# Patient Record
Sex: FEMALE | Race: WHITE | NOT HISPANIC OR LATINO | Employment: FULL TIME | URBAN - METROPOLITAN AREA
[De-identification: names, ages, dates, MRNs, and addresses within clinical notes are randomized per-mention and may not be internally consistent; named-entity substitution may affect disease eponyms.]

---

## 2021-03-09 ENCOUNTER — OFFICE VISIT (OUTPATIENT)
Dept: OBGYN CLINIC | Facility: CLINIC | Age: 36
End: 2021-03-09
Payer: COMMERCIAL

## 2021-03-09 VITALS
BODY MASS INDEX: 22.76 KG/M2 | DIASTOLIC BLOOD PRESSURE: 66 MMHG | HEART RATE: 70 BPM | HEIGHT: 67 IN | SYSTOLIC BLOOD PRESSURE: 100 MMHG | WEIGHT: 145 LBS

## 2021-03-09 DIAGNOSIS — S62.232B: Primary | ICD-10-CM

## 2021-03-09 PROCEDURE — 99203 OFFICE O/P NEW LOW 30 MIN: CPT | Performed by: ORTHOPAEDIC SURGERY

## 2021-03-09 NOTE — PROGRESS NOTES
Assessment/Plan:  1  Open displaced fracture of base of first metacarpal bone of left hand, unspecified fracture morphology, initial encounter  Ambulatory referral to [facility] Trever Rider has a displaced fracture at the base of her first metacarpal   This type of fracture will not heal with conservative measures and will likely require surgery  I would like for her to follow-up with my partner Dr Susan Galvez, who is our orthopedic hand specialist, to discuss surgical intervention for her fracture  She 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 plan  We will place her back in a thumb spica splint and have her follow-up with Dr Susan Galvez later this week  Subjective:   Timi Gonzalez is a 30 y o  female who presents to the office for evaluation for left hand injury  She states she was kicked in the hand by her horse on 3/6/2021  She went to [facility] and had an x-ray which showed a displaced first metacarpal fracture  She was advised to follow-up with orthopedics  The other provider she was sent to could not take her insurance and she was redirected to our office today  Today she has pain that is sharp and stabbing in nature in the base of her left thumb  It worsens with any motion of her thumb  She feels swelling and numbness down into her left thumb  She has been immobilized in a thumb spica splint  Review of Systems   Constitutional: Negative for chills, fever and unexpected weight change  HENT: Negative for hearing loss, nosebleeds and sore throat  Eyes: Negative for pain, redness and visual disturbance  Respiratory: Negative for cough, shortness of breath and wheezing  Cardiovascular: Negative for chest pain, palpitations and leg swelling  Gastrointestinal: Negative for abdominal pain, nausea and vomiting  Endocrine: Negative for polydipsia and polyuria  Genitourinary: Negative for dysuria and hematuria     Musculoskeletal:        See HPI   Skin: Negative for rash and wound    Neurological: Negative for dizziness, numbness and headaches  Psychiatric/Behavioral: Negative for decreased concentration and suicidal ideas  The patient is not nervous/anxious  History reviewed  No pertinent past medical history  Past Surgical History:   Procedure Laterality Date    WISDOM TOOTH EXTRACTION         Family History   Problem Relation Age of Onset    Leukemia Maternal Grandmother     Diabetes Maternal Grandfather        Social History     Occupational History    Not on file   Tobacco Use    Smoking status: Never Smoker    Smokeless tobacco: Never Used   Substance and Sexual Activity    Alcohol use: Never     Frequency: Never    Drug use: Not on file    Sexual activity: Not on file       No current outpatient medications on file  Allergies   Allergen Reactions    Amoxicillin Hives and Rash       Objective:  Vitals:    03/09/21 1419   BP: 100/66   Pulse: 70       Left Hand Exam     Tenderness   Left hand tenderness location: Tenderness to the base of the first metacarpal      Range of Motion   Hand   MP Thumb: abnormal   DIP Thumb: abnormal     Other   Erythema: absent  Sensation: decreased  Pulse: present            Physical Exam  Vitals signs reviewed  Constitutional:       Appearance: She is well-developed  HENT:      Head: Normocephalic and atraumatic  Eyes:      Conjunctiva/sclera: Conjunctivae normal       Pupils: Pupils are equal, round, and reactive to light  Neck:      Musculoskeletal: Normal range of motion and neck supple  Cardiovascular:      Rate and Rhythm: Normal rate  Pulmonary:      Effort: Pulmonary effort is normal  No respiratory distress  Skin:     General: Skin is warm and dry  Neurological:      Mental Status: She is alert and oriented to person, place, and time  Psychiatric:         Behavior: Behavior normal          I have personally reviewed pertinent films in PACS and my interpretation is as follows:   Three-view x-ray of the left hand dated 3/6/2021 demonstrates a fracture of the base of the first metacarpal with dislocation of the fracture fragment

## 2021-03-11 ENCOUNTER — CONSULT (OUTPATIENT)
Dept: OBGYN CLINIC | Facility: CLINIC | Age: 36
End: 2021-03-11
Payer: COMMERCIAL

## 2021-03-11 VITALS
SYSTOLIC BLOOD PRESSURE: 95 MMHG | DIASTOLIC BLOOD PRESSURE: 60 MMHG | HEIGHT: 67 IN | BODY MASS INDEX: 22.76 KG/M2 | HEART RATE: 76 BPM | WEIGHT: 145 LBS

## 2021-03-11 DIAGNOSIS — S62.232B: Primary | ICD-10-CM

## 2021-03-11 DIAGNOSIS — Z20.822 ENCOUNTER FOR PREOPERATIVE SCREENING LABORATORY TESTING FOR COVID-19 VIRUS: ICD-10-CM

## 2021-03-11 DIAGNOSIS — Z01.812 ENCOUNTER FOR PREOPERATIVE SCREENING LABORATORY TESTING FOR COVID-19 VIRUS: ICD-10-CM

## 2021-03-11 PROCEDURE — U0005 INFEC AGEN DETEC AMPLI PROBE: HCPCS | Performed by: ORTHOPAEDIC SURGERY

## 2021-03-11 PROCEDURE — 99213 OFFICE O/P EST LOW 20 MIN: CPT | Performed by: ORTHOPAEDIC SURGERY

## 2021-03-11 PROCEDURE — U0003 INFECTIOUS AGENT DETECTION BY NUCLEIC ACID (DNA OR RNA); SEVERE ACUTE RESPIRATORY SYNDROME CORONAVIRUS 2 (SARS-COV-2) (CORONAVIRUS DISEASE [COVID-19]), AMPLIFIED PROBE TECHNIQUE, MAKING USE OF HIGH THROUGHPUT TECHNOLOGIES AS DESCRIBED BY CMS-2020-01-R: HCPCS | Performed by: ORTHOPAEDIC SURGERY

## 2021-03-11 RX ORDER — CLINDAMYCIN PHOSPHATE 600 MG/50ML
600 INJECTION INTRAVENOUS ONCE
Status: CANCELLED | OUTPATIENT
Start: 2021-03-12 | End: 2021-03-11

## 2021-03-11 RX ORDER — IBUPROFEN 200 MG
TABLET ORAL EVERY 6 HOURS PRN
COMMUNITY

## 2021-03-11 NOTE — H&P (VIEW-ONLY)
Assessment/Plan:  1  Open displaced fracture of base of first metacarpal bone of left hand, unspecified fracture morphology, initial encounter     2  Encounter for preoperative screening laboratory testing for COVID-19 virus  PAT Covid Screening       Scribe Attestation    I,:  Ana Carrillo MA am acting as a scribe while in the presence of the attending physician :       I,:  Saint Buff,  personally performed the services described in this documentation    as scribed in my presence :             I discussed with David that x-rays demonstrates a displaced base of the first metacarpal fracture with an associated dislocation of the [facility] joint  I explained to that this will not heal with conservative treatment options and she may have issues with stiffness and pain  Surgical intervention was discussed in the form of closed reduction percutaneous pinning  She was agreeable to this  I expressed to her the importance of keeping this clean  She does work with horses  She 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  Risks of the surgery are inclusive of but not limited to bleeding, infection, nerve injury, blood clot, worsening of symptoms, not achieving the anticipated results, persistent stiffness, weakness and the need for additional surgery  The patient verbally stated they understood those risks and would like to proceed with the surgery  Surgical consent was signed in the office today  I will see her the day of surgery  Patient will require a COVID screening prior to surgery  This will be performed today  Surgery will be scheduled for tomorrow  Subjective:   Mary Lou Wilkinson is a 30 y o  female who presents to the office today as a referral from my partner Dr Johnny Johnson for evaluation of left thumb pain  Patient states on 3/6/21 she was kicked in the hand by her horse  She states she noted immediate pain and swelling  She presented to [facility] after the injury where x-rays were taken   She was diagnosed with a first metacarpal fracture  Patient was placed in a splint and told to follow up with orthopedics  Patient was been tolerating the splint well  She was evaluated by my partner Dr Kenya Turk on 3/9/21 and was referred for surgical intervention  Patient states initially she was able to self reduce the thumb however, if continued to pop out of place  Patient states her swelling has improved  She notes pain to the base of her thumb  She also notes numbness to the tip of the thumb and slightness to her index finger  Review of Systems   Constitutional: Negative for chills and fever  HENT: Negative for drooling and sneezing  Eyes: Negative for redness  Respiratory: Negative for cough and wheezing  Gastrointestinal: Negative for nausea and vomiting  Musculoskeletal: Negative for arthralgias, joint swelling and myalgias  Neurological: Negative for weakness and numbness  Psychiatric/Behavioral: Negative for behavioral problems  The patient is not nervous/anxious  History reviewed  No pertinent past medical history  Past Surgical History:   Procedure Laterality Date    WISDOM TOOTH EXTRACTION         Family History   Problem Relation Age of Onset    Leukemia Maternal Grandmother     Diabetes Maternal Grandfather        Social History     Occupational History    Not on file   Tobacco Use    Smoking status: Never Smoker    Smokeless tobacco: Never Used   Substance and Sexual Activity    Alcohol use: Never     Frequency: Never    Drug use: Not on file    Sexual activity: Not on file       No current outpatient medications on file      Allergies   Allergen Reactions    Amoxicillin Hives and Rash       Objective:  Vitals:    03/11/21 0900   BP: 95/60   Pulse: 76       Ortho Exam     Left thumb    Swelling improved  Bruising about the thumb   Compartments soft  Brisk capillary refill  S/m intact median, radial, and ulnar nerve     Physical Exam  Constitutional:       Appearance: She is well-developed  HENT:      Head: Normocephalic and atraumatic  Eyes:      General:         Right eye: No discharge  Left eye: No discharge  Conjunctiva/sclera: Conjunctivae normal    Neck:      Musculoskeletal: Normal range of motion and neck supple  Cardiovascular:      Rate and Rhythm: Normal rate  Pulmonary:      Effort: Pulmonary effort is normal  No respiratory distress  Musculoskeletal:      Comments: As noted in HPI   Skin:     General: Skin is warm and dry  Neurological:      Mental Status: She is alert and oriented to person, place, and time  Psychiatric:         Behavior: Behavior normal          Thought Content: Thought content normal          Judgment: Judgment normal          I have personally reviewed pertinent films in PACS and my interpretation is as follows:X-ray left thumb performed at an outside facility demonstrates displaced base of the first metacarpal fracture

## 2021-03-11 NOTE — PRE-PROCEDURE INSTRUCTIONS
My Surgical Experience    The following information was developed to assist you to prepare for your operation  What do I need to do before coming to the hospital?   Arrange for a responsible person to drive you to and from the hospital    Arrange care for your children at home  Children are not allowed in the recovery areas of the hospital   Plan to wear clothing that is easy to put on and take off  If you are having shoulder surgery, wear a shirt that buttons or zippers in the front  Bathing  o Shower the evening before and the morning of your surgery with an antibacterial soap  Please refer to the Pre Op Showering Instructions for Surgery Patients Sheet   o Remove nail polish and all body piercing jewelry  o Do not shave any body part for at least 24 hours before surgery-this includes face, arms, legs and upper body  Food  o Nothing to eat or drink after midnight the night before your surgery  This includes candy and chewing gum  o Exception: If your surgery is after 12:00pm (noon), you may have clear liquids such as 7-Up®, ginger ale, apple or cranberry juice, Jell-O®, water, or clear broth until 8:00 am  o Do not drink milk or juice with pulp on the morning before surgery  o Do not drink alcohol 24 hours before surgery  Medicine  o Follow instructions you received from your surgeon about which medicines you may take on the day of surgery  o If instructed to take medicine on the morning of surgery, take pills with just a small sip of water  Call your prescribing doctor for specific infroamtion on what to do if you take insulin    What should I bring to the hospital?    Bring:  Leann Jones or a walker, if you have them, for foot or knee surgery   A list of the daily medicines, vitamins, minerals, herbals and nutritional supplements you take   Include the dosages of medicines and the time you take them each day   Glasses, dentures or hearing aids   Minimal clothing; you will be wearing hospital sleepwear   Photo ID; required to verify your identity   If you have a Living Will or Power of , bring a copy of the documents   If you have an ostomy, bring an extra pouch and any supplies you use    Do not bring   Medicines or inhalers   Money, valuables or jewelry    What other information should I know about the day of surgery?  Notify your surgeons if you develop a cold, sore throat, cough, fever, rash or any other illness   Report to the Ambulatory Surgical/Same Day Surgery Unit   You will be instructed to stop at Registration only if you have not been pre-registered   Inform your  fi they do not stay that they will be asked by the staff to leave a phone number where they can be reached   Be available to be reached before surgery  In the event the operating room schedule changes, you may be asked to come in earlier or later than expected    *It is important to tell your doctor and others involved in your health care if you are taking or have been taking any non-prescription drugs, vitamins, minerals, herbals or other nutritional supplements  Any of these may interact with some food or medicines and cause a reaction      Pre-Surgery Instructions:   Medication Instructions    ibuprofen (MOTRIN) 200 mg tablet Instructed patient per Anesthesia Guidelines

## 2021-03-11 NOTE — PROGRESS NOTES
Assessment/Plan:  1  Open displaced fracture of base of first metacarpal bone of left hand, unspecified fracture morphology, initial encounter     2  Encounter for preoperative screening laboratory testing for COVID-19 virus  PAT Covid Screening       Scribe Attestation    I,:  Ana Carrillo MA am acting as a scribe while in the presence of the attending physician :       I,:  Natalie Calles DO personally performed the services described in this documentation    as scribed in my presence :             I discussed with Amnasolo that x-rays demonstrates a displaced base of the first metacarpal fracture with an associated dislocation of the [address] joint  I explained to that this will not heal with conservative treatment options and she may have issues with stiffness and pain  Surgical intervention was discussed in the form of closed reduction percutaneous pinning  She was agreeable to this  I expressed to her the importance of keeping this clean  She does work with horses  She 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  Risks of the surgery are inclusive of but not limited to bleeding, infection, nerve injury, blood clot, worsening of symptoms, not achieving the anticipated results, persistent stiffness, weakness and the need for additional surgery  The patient verbally stated they understood those risks and would like to proceed with the surgery  Surgical consent was signed in the office today  I will see her the day of surgery  Patient will require a COVID screening prior to surgery  This will be performed today  Surgery will be scheduled for tomorrow  Subjective:   Yessica Hawk is a 30 y o  female who presents to the office today as a referral from my partner Dr Kenneth Soriano for evaluation of left thumb pain  Patient states on 3/6/21 she was kicked in the hand by her horse  She states she noted immediate pain and swelling  She presented to [facility] after the injury where x-rays were taken   She was diagnosed with a first metacarpal fracture  Patient was placed in a splint and told to follow up with orthopedics  Patient was been tolerating the splint well  She was evaluated by my partner Dr Nirmal Linder on 3/9/21 and was referred for surgical intervention  Patient states initially she was able to self reduce the thumb however, if continued to pop out of place  Patient states her swelling has improved  She notes pain to the base of her thumb  She also notes numbness to the tip of the thumb and slightness to her index finger  Review of Systems   Constitutional: Negative for chills and fever  HENT: Negative for drooling and sneezing  Eyes: Negative for redness  Respiratory: Negative for cough and wheezing  Gastrointestinal: Negative for nausea and vomiting  Musculoskeletal: Negative for arthralgias, joint swelling and myalgias  Neurological: Negative for weakness and numbness  Psychiatric/Behavioral: Negative for behavioral problems  The patient is not nervous/anxious  History reviewed  No pertinent past medical history  Past Surgical History:   Procedure Laterality Date    WISDOM TOOTH EXTRACTION         Family History   Problem Relation Age of Onset    Leukemia Maternal Grandmother     Diabetes Maternal Grandfather        Social History     Occupational History    Not on file   Tobacco Use    Smoking status: Never Smoker    Smokeless tobacco: Never Used   Substance and Sexual Activity    Alcohol use: Never     Frequency: Never    Drug use: Not on file    Sexual activity: Not on file       No current outpatient medications on file      Allergies   Allergen Reactions    Amoxicillin Hives and Rash       Objective:  Vitals:    03/11/21 0900   BP: 95/60   Pulse: 76       Ortho Exam     Left thumb    Swelling improved  Bruising about the thumb   Compartments soft  Brisk capillary refill  S/m intact median, radial, and ulnar nerve     Physical Exam  Constitutional:       Appearance: She is well-developed  HENT:      Head: Normocephalic and atraumatic  Eyes:      General:         Right eye: No discharge  Left eye: No discharge  Conjunctiva/sclera: Conjunctivae normal    Neck:      Musculoskeletal: Normal range of motion and neck supple  Cardiovascular:      Rate and Rhythm: Normal rate  Pulmonary:      Effort: Pulmonary effort is normal  No respiratory distress  Musculoskeletal:      Comments: As noted in HPI   Skin:     General: Skin is warm and dry  Neurological:      Mental Status: She is alert and oriented to person, place, and time  Psychiatric:         Behavior: Behavior normal          Thought Content: Thought content normal          Judgment: Judgment normal          I have personally reviewed pertinent films in PACS and my interpretation is as follows:X-ray left thumb performed at an outside facility demonstrates displaced base of the first metacarpal fracture

## 2021-03-12 ENCOUNTER — ANESTHESIA (OUTPATIENT)
Dept: PERIOP | Facility: HOSPITAL | Age: 36
End: 2021-03-12
Payer: COMMERCIAL

## 2021-03-12 ENCOUNTER — HOSPITAL ENCOUNTER (OUTPATIENT)
Facility: HOSPITAL | Age: 36
Setting detail: OUTPATIENT SURGERY
Discharge: HOME | End: 2021-03-12
Attending: ORTHOPAEDIC SURGERY | Admitting: ORTHOPAEDIC SURGERY
Payer: COMMERCIAL

## 2021-03-12 ENCOUNTER — ANESTHESIA EVENT (OUTPATIENT)
Dept: PERIOP | Facility: HOSPITAL | Age: 36
End: 2021-03-12
Payer: COMMERCIAL

## 2021-03-12 VITALS
TEMPERATURE: 97.5 F | DIASTOLIC BLOOD PRESSURE: 63 MMHG | SYSTOLIC BLOOD PRESSURE: 149 MMHG | RESPIRATION RATE: 16 BRPM | HEART RATE: 79 BPM | OXYGEN SATURATION: 100 %

## 2021-03-12 LAB
EXT PREGNANCY TEST URINE: NEGATIVE
EXT. CONTROL: NORMAL
FLUAV RNA RESP QL NAA+PROBE: NEGATIVE
FLUBV RNA RESP QL NAA+PROBE: NEGATIVE
RSV RNA RESP QL NAA+PROBE: NEGATIVE
SARS-COV-2 RNA RESP QL NAA+PROBE: NEGATIVE

## 2021-03-12 PROCEDURE — C1713 ANCHOR/SCREW BN/BN,TIS/BN: HCPCS | Performed by: ORTHOPAEDIC SURGERY

## 2021-03-12 PROCEDURE — 81025 URINE PREGNANCY TEST: CPT | Performed by: ORTHOPAEDIC SURGERY

## 2021-03-12 PROCEDURE — 0241U HB NFCT DS VIR RESP RNA 4 TRGT: CPT | Performed by: ORTHOPAEDIC SURGERY

## 2021-03-12 PROCEDURE — 26650 TREAT THUMB FRACTURE: CPT | Performed by: ORTHOPAEDIC SURGERY

## 2021-03-12 DEVICE — K- WIRE, SINGLE TROCAR POINT
Type: IMPLANTABLE DEVICE | Site: HAND | Status: FUNCTIONAL
Brand: LOCON-T

## 2021-03-12 RX ORDER — SODIUM CHLORIDE, SODIUM LACTATE, POTASSIUM CHLORIDE, CALCIUM CHLORIDE 600; 310; 30; 20 MG/100ML; MG/100ML; MG/100ML; MG/100ML
50 INJECTION, SOLUTION INTRAVENOUS CONTINUOUS
Status: DISCONTINUED | OUTPATIENT
Start: 2021-03-12 | End: 2021-03-12 | Stop reason: HOSPADM

## 2021-03-12 RX ORDER — SODIUM CHLORIDE, SODIUM LACTATE, POTASSIUM CHLORIDE, CALCIUM CHLORIDE 600; 310; 30; 20 MG/100ML; MG/100ML; MG/100ML; MG/100ML
INJECTION, SOLUTION INTRAVENOUS CONTINUOUS PRN
Status: DISCONTINUED | OUTPATIENT
Start: 2021-03-12 | End: 2021-03-12

## 2021-03-12 RX ORDER — PROPOFOL 10 MG/ML
INJECTION, EMULSION INTRAVENOUS AS NEEDED
Status: DISCONTINUED | OUTPATIENT
Start: 2021-03-12 | End: 2021-03-12

## 2021-03-12 RX ORDER — MIDAZOLAM HYDROCHLORIDE 2 MG/2ML
INJECTION, SOLUTION INTRAMUSCULAR; INTRAVENOUS AS NEEDED
Status: DISCONTINUED | OUTPATIENT
Start: 2021-03-12 | End: 2021-03-12

## 2021-03-12 RX ORDER — GLYCOPYRROLATE 0.2 MG/ML
INJECTION INTRAMUSCULAR; INTRAVENOUS AS NEEDED
Status: DISCONTINUED | OUTPATIENT
Start: 2021-03-12 | End: 2021-03-12

## 2021-03-12 RX ORDER — ONDANSETRON 2 MG/ML
4 INJECTION INTRAMUSCULAR; INTRAVENOUS ONCE AS NEEDED
Status: DISCONTINUED | OUTPATIENT
Start: 2021-03-12 | End: 2021-03-12 | Stop reason: HOSPADM

## 2021-03-12 RX ORDER — FENTANYL CITRATE/PF 50 MCG/ML
25 SYRINGE (ML) INJECTION
Status: DISCONTINUED | OUTPATIENT
Start: 2021-03-12 | End: 2021-03-12 | Stop reason: HOSPADM

## 2021-03-12 RX ORDER — EPHEDRINE SULFATE 50 MG/ML
INJECTION INTRAVENOUS AS NEEDED
Status: DISCONTINUED | OUTPATIENT
Start: 2021-03-12 | End: 2021-03-12

## 2021-03-12 RX ORDER — MAGNESIUM HYDROXIDE 1200 MG/15ML
LIQUID ORAL AS NEEDED
Status: DISCONTINUED | OUTPATIENT
Start: 2021-03-12 | End: 2021-03-12 | Stop reason: HOSPADM

## 2021-03-12 RX ORDER — DEXAMETHASONE SODIUM PHOSPHATE 4 MG/ML
INJECTION, SOLUTION INTRA-ARTICULAR; INTRALESIONAL; INTRAMUSCULAR; INTRAVENOUS; SOFT TISSUE AS NEEDED
Status: DISCONTINUED | OUTPATIENT
Start: 2021-03-12 | End: 2021-03-12

## 2021-03-12 RX ORDER — PROPOFOL 10 MG/ML
INJECTION, EMULSION INTRAVENOUS CONTINUOUS PRN
Status: DISCONTINUED | OUTPATIENT
Start: 2021-03-12 | End: 2021-03-12

## 2021-03-12 RX ORDER — ONDANSETRON 2 MG/ML
INJECTION INTRAMUSCULAR; INTRAVENOUS AS NEEDED
Status: DISCONTINUED | OUTPATIENT
Start: 2021-03-12 | End: 2021-03-12

## 2021-03-12 RX ORDER — CLINDAMYCIN PHOSPHATE 600 MG/50ML
600 INJECTION INTRAVENOUS ONCE
Status: COMPLETED | OUTPATIENT
Start: 2021-03-12 | End: 2021-03-12

## 2021-03-12 RX ORDER — FENTANYL CITRATE 50 UG/ML
INJECTION, SOLUTION INTRAMUSCULAR; INTRAVENOUS AS NEEDED
Status: DISCONTINUED | OUTPATIENT
Start: 2021-03-12 | End: 2021-03-12

## 2021-03-12 RX ADMIN — PROPOFOL 50 MG: 10 INJECTION, EMULSION INTRAVENOUS at 14:30

## 2021-03-12 RX ADMIN — PROPOFOL 30 MG: 10 INJECTION, EMULSION INTRAVENOUS at 14:36

## 2021-03-12 RX ADMIN — ONDANSETRON 4 MG: 2 INJECTION INTRAMUSCULAR; INTRAVENOUS at 14:42

## 2021-03-12 RX ADMIN — LIDOCAINE HYDROCHLORIDE 40 MG: 20 INJECTION, SOLUTION INTRAVENOUS at 14:30

## 2021-03-12 RX ADMIN — FENTANYL CITRATE 50 MCG: 50 INJECTION, SOLUTION INTRAMUSCULAR; INTRAVENOUS at 14:40

## 2021-03-12 RX ADMIN — CLINDAMYCIN PHOSPHATE 600 MG: 600 INJECTION, SOLUTION INTRAVENOUS at 14:29

## 2021-03-12 RX ADMIN — SODIUM CHLORIDE, SODIUM LACTATE, POTASSIUM CHLORIDE, AND CALCIUM CHLORIDE: .6; .31; .03; .02 INJECTION, SOLUTION INTRAVENOUS at 14:28

## 2021-03-12 RX ADMIN — PROPOFOL 150 MCG/KG/MIN: 10 INJECTION, EMULSION INTRAVENOUS at 14:30

## 2021-03-12 RX ADMIN — DEXAMETHASONE SODIUM PHOSPHATE 4 MG: 4 INJECTION, SOLUTION INTRA-ARTICULAR; INTRALESIONAL; INTRAMUSCULAR; INTRAVENOUS; SOFT TISSUE at 14:42

## 2021-03-12 RX ADMIN — FENTANYL CITRATE 50 MCG: 50 INJECTION, SOLUTION INTRAMUSCULAR; INTRAVENOUS at 14:28

## 2021-03-12 RX ADMIN — MIDAZOLAM 2 MG: 1 INJECTION INTRAMUSCULAR; INTRAVENOUS at 14:28

## 2021-03-12 RX ADMIN — GLYCOPYRROLATE 0.2 MG: 0.2 INJECTION, SOLUTION INTRAMUSCULAR; INTRAVENOUS at 14:30

## 2021-03-12 RX ADMIN — EPHEDRINE SULFATE 10 MG: 50 INJECTION, SOLUTION INTRAVENOUS at 14:52

## 2021-03-12 NOTE — ANESTHESIA POSTPROCEDURE EVALUATION
Post-Op Assessment Note    CV Status:  Stable  Pain Score: 0    Pain management: adequate     Mental Status:  Sleepy   Hydration Status:  Stable and euvolemic   PONV Controlled:  None   Airway Patency:  Patent       Staff: CRNA         No complications documented      BP  107/49    Temp 36 0C   Pulse 69   Resp 20   SpO2 99%6lfm

## 2021-03-12 NOTE — PERIOPERATIVE NURSING NOTE
Patient received from PACU in 0/10 pain  Dressings were clean, dry, and intact  Neurovascular assessment was WDL  VSS

## 2021-03-12 NOTE — PERIOPERATIVE NURSING NOTE
Pt d/c to home at this time w/Mother  Via: Wheelchair  Pt left with all belongings  Iv was D/C intact with dry sterile dressing  Encouraged to keep follow up appointments, Verbalized understanding  D/C instructions reviewed and explained  Verbalized understanding  Patient will contact the office for a two week follow up appointment, if not given one during her post-op call with Dr Nicholas Mendes  Verbalized understanding

## 2021-03-12 NOTE — ANESTHESIA PREPROCEDURE EVALUATION
Procedure:  OPEN REDUCTION W/ INTERNAL FIXATION (ORIF) HAND (Left Hand)    Relevant Problems   No relevant active problems        Physical Exam    Airway    Mallampati score: II  TM Distance: >3 FB  Neck ROM: full     Dental   No notable dental hx     Cardiovascular  Rhythm: regular, Rate: normal, Cardiovascular exam normal    Pulmonary  Pulmonary exam normal Breath sounds clear to auscultation,     Other Findings        Anesthesia Plan  ASA Score- 2     Anesthesia Type- IV sedation with anesthesia with ASA Monitors  Additional Monitors:   Airway Plan:           Plan Factors-Exercise tolerance (METS): >4 METS  Chart reviewed  Existing labs reviewed  Patient summary reviewed  Patient is not a current smoker  Induction- intravenous  Postoperative Plan- Plan for postoperative opioid use  Informed Consent- Anesthetic plan and risks discussed with patient  I personally reviewed this patient with the CRNA  Discussed and agreed on the Anesthesia Plan with the CRNA  Dana Bell

## 2021-03-12 NOTE — DISCHARGE INSTRUCTIONS
Dr Tiara Granados Operative Instructions  Pinning Left Thumb    You have had surgery on your arm today, please read and follow the information below:  · Elevate your hand above your elbow during the next 24-48 hours to help with swelling  · Place your hand and arm over your head with motion at your shoulder three times a day  · Do not apply any cream/ointment/oil to your incisions including antibiotics  · Do not soak your hands in standing water (dishwater, tubs, Jacuzzi's, pools, etc ) until given permission (typically 2-3 weeks after injury)    Call the office if you notice any:  · Increased numbness or tingling of your hand or fingers that is not relieved with elevation  · Increasing pain that is not controlled with medication  · Difficulty chewing, breathing, swallowing  · Chest pains or shortness of breath  · Fever over 101 4 degrees  Bandage: Do NOT remove bandage until follow-up appointment  Motion: Move fingers into a fist 5 times a day, DO NOT move any splinted fingers  Weight bearing status: The operated extremity should be non-weight bearing until further notice  Ice: Ice for 10 minutes every hour as needed for swelling x 24 hours  Sling: No sling necessary  Pain medication:   Percocet 1 tab every 6 hours AS NEEDED for pain     Follow-up Appointment: 10-14 days  Please call the office at [phone] if you have any questions or concerns regarding your post-operative care

## 2021-03-13 ENCOUNTER — TELEPHONE (OUTPATIENT)
Dept: OTHER | Facility: OTHER | Age: 36
End: 2021-03-13

## 2021-03-13 LAB — SARS-COV-2 RNA RESP QL NAA+PROBE: NEGATIVE

## 2021-03-13 NOTE — OP NOTE
OPERATIVE REPORT  PATIENT NAME: Yokasta Turner    :  1985  MRN: [id number]  Pt Location: [facility] OR ROOM 03    SURGERY DATE: 3/12/2021    Surgeon(s) and Role:     * Charles Young DO - Primary    Preop Diagnosis:  Open displaced fracture of base of first metacarpal bone of left hand, unspecified fracture morphology, initial encounter [C72 232B]    Post-Op Diagnosis Codes:     * Open displaced fracture of base of first metacarpal bone of left hand, unspecified fracture morphology, initial encounter [W82 418B]    Procedure(s) (LRB):  OPEN REDUCTION W/ INTERNAL FIXATION (ORIF) HAND (Left)    Specimen(s):  * No specimens in log *    Estimated Blood Loss:   Minimal    Drains:  * No LDAs found *    Anesthesia Type:   IV Sedation with Anesthesia    Operative Indications:  Open displaced fracture of base of first metacarpal bone of left hand, unspecified fracture morphology, initial encounter [R34 [id number]]      Operative Findings:  Displaced fx/dis thumb cmc joint  Reduced thumb cmc joint and pinned with 045 k wires x 3      Complications:   None    Procedure and Technique:    Patient is a 67-year-old female presented to the office yesterday with some pain  She was seen by my partner Dr Merlinda Primas is found to have a Marie fracture dislocation of her  Left thumb  Radius surgical versus nonsurgical options of the fracture dislocation  I explained to the patient that if allowed to heal this way patient may have chronic pain and dysfunction of the thumb  Risks benefits alternatives were explained of treatment options  Patient elected undergo closed versus open reduction internal fixation of the left thumb the operating room  And all questions were answered  Consent forms were obtained  Day of surgery patient identified by 1st last name  The left thumb was marked  Patient with the anesthesia team they deemed that sedation plus local is most appropriate  Patient consented this        Patient was transferred from the preop area the OR underwent sedation without complication  Time-out was performed successfully  Everyone in the room was in agreement  Antibiotics had been given  I reviewed the consent form myself  Using 50 50 mix of 1% plain lidocaine and 0 5% plain Marcaine digital block was performed left thumb with infiltration around the thumb CMC joint as well  This was done under sterile technique  Well-padded tourniquet was placed on     the arm  Patient underwent sterile prep and drape    The limb was  Was not exsanguinated as we did plan to attempt closed reduction  Reduction maneuver was performed of the left thumb CMC joint which included traction pronation and abduction of the 1st ray  Fluoroscopic guidance confirmed that anatomic reduction had been achieved  While holding the thumb in this position 3045 K-wires were placed 1 being through the fracture site to stabilize the fracture and 2 being from the 1st metacarpal into the trapezium  Gentle range of motion after the construct was placed demonstrated no subluxation of the 1st metacarpal and stable placement of the hardware  Multiple fluoroscopic views were taken of the left thumb including AP and lateral views as well as thumb CMC including Dereje's view  Views demonstrated anatomic reduction of fracture with stable hardware  After we had reduced the fracture and pin this successfully pins were then cut and  And placed beneath the skin  Patient tolerated surgery well  Adaptic dressing was placed over the pin sites  Patient is placed in a soft sterile dressing and a thumb spica splint  Patient was woken from anesthesia transferred the OR PACU in stable condition     I was present for the entire procedure and A qualified resident physician was not available    Patient Disposition:  PACU  and hemodynamically stable    SIGNATURE: Michelle Rodas DO  DATE: March 12, 2021  TIME: 8:26 PM

## 2021-03-13 NOTE — TELEPHONE ENCOUNTER
PT had surgery yesterday and took four oxycodone pills so far and now she has a rash all over her body and neck and is itching  Informed caller to call back if they do not receive a call back from a provider within 20-30 minutes

## 2021-03-16 ENCOUNTER — TELEPHONE (OUTPATIENT)
Dept: OBGYN CLINIC | Facility: HOSPITAL | Age: 36
End: 2021-03-16

## 2021-03-19 ENCOUNTER — OFFICE VISIT (OUTPATIENT)
Dept: OBGYN CLINIC | Facility: CLINIC | Age: 36
End: 2021-03-19
Payer: COMMERCIAL

## 2021-03-19 VITALS
HEART RATE: 66 BPM | SYSTOLIC BLOOD PRESSURE: 100 MMHG | DIASTOLIC BLOOD PRESSURE: 63 MMHG | BODY MASS INDEX: 22.76 KG/M2 | WEIGHT: 145 LBS | HEIGHT: 67 IN

## 2021-03-19 DIAGNOSIS — Z47.89 AFTERCARE FOLLOWING SURGERY OF THE MUSCULOSKELETAL SYSTEM: Primary | ICD-10-CM

## 2021-03-19 PROCEDURE — 99024 POSTOP FOLLOW-UP VISIT: CPT | Performed by: ORTHOPAEDIC SURGERY

## 2021-03-19 PROCEDURE — 29075 APPL CST ELBW FNGR SHORT ARM: CPT | Performed by: ORTHOPAEDIC SURGERY

## 2021-03-19 NOTE — PROGRESS NOTES
Assessment/Plan:  1  Aftercare following surgery of the musculoskeletal system  Cast application       Scribe Attestation    I,:  Ana Carrillo MA am acting as a scribe while in the presence of the attending physician :       I,:  Elizabeth Sommers DO personally performed the services described in this documentation    as scribed in my presence :             David is doing well postoperatively  The pin sites are clean and intact  There is no erythema and signs of infection  A thumb spica cast was applied in the office today  Cast care instructions were provided  She will remain nonweightbearing with the LUE  She will follow up in 1 week for repeat evaluation and x-ray in the cast       Subjective:   Hartwell Barthel is a 30 y o  female who presents to the office today for follow up evaluation 1 week s/p CRPP left 1st metacarpal  Patient states she is doing well  Patient states she did have a reaction to the pain medication and did develop a rash  She notes decreased sensation to the thumb  Review of Systems   Constitutional: Negative for chills and fever  HENT: Negative for drooling and sneezing  Eyes: Negative for redness  Respiratory: Negative for cough and wheezing  Gastrointestinal: Negative for nausea and vomiting  Musculoskeletal: Negative for arthralgias, joint swelling and myalgias  Neurological: Negative for weakness and numbness  Psychiatric/Behavioral: Negative for behavioral problems  The patient is not nervous/anxious  History reviewed  No pertinent past medical history      Past Surgical History:   Procedure Laterality Date    HAND FRACTURE REPAIR Left 3/12/2021    Procedure: OPEN REDUCTION W/ INTERNAL FIXATION (ORIF) HAND;  Surgeon: Elizabeth Sommers DO;  Location: [facility];  Service: Orthopedics    WISDOM TOOTH EXTRACTION         Family History   Problem Relation Age of Onset    Leukemia Maternal Grandmother     Diabetes Maternal Grandfather        Social History Occupational History    Not on file   Tobacco Use    Smoking status: Never Smoker    Smokeless tobacco: Never Used   Substance and Sexual Activity    Alcohol use: Never     Frequency: Never    Drug use: Not on file    Sexual activity: Not on file         Current Outpatient Medications:     ibuprofen (MOTRIN) 200 mg tablet, Take by mouth every 6 (six) hours as needed for mild pain, Disp: , Rfl:     Allergies   Allergen Reactions    Amoxicillin Hives and Rash       Objective:  Vitals:    03/19/21 0859   BP: 100/63   Pulse: 66       Ortho Exam     Left hand    Pin sites clean and intact  Pins buried  No erythema or signs of infection  Compartments soft  Brisk capillary refill  S/m intact median, radial, and ulnar nerve     Physical Exam  Constitutional:       Appearance: She is well-developed  HENT:      Head: Normocephalic and atraumatic  Eyes:      General:         Right eye: No discharge  Left eye: No discharge  Conjunctiva/sclera: Conjunctivae normal    Neck:      Musculoskeletal: Normal range of motion and neck supple  Cardiovascular:      Rate and Rhythm: Normal rate  Pulmonary:      Effort: Pulmonary effort is normal  No respiratory distress  Musculoskeletal:      Comments: As noted in HPI   Skin:     General: Skin is warm and dry  Neurological:      Mental Status: She is alert and oriented to person, place, and time  Psychiatric:         Behavior: Behavior normal          Thought Content: Thought content normal          Judgment: Judgment normal      Cast application    Date/Time: 3/19/2021 9:32 AM  Performed by: Sabiha Amaro DO  Authorized by: Sabiha Amaro DO   Universal Protocol:  Consent: Verbal consent obtained    Consent given by: patient  Patient identity confirmed: verbally with patient      Procedure details:     Laterality:  Left    Location:  Hand    Hand:  L handCast type:  Short arm (thumb spica )    Supplies:  Cotton padding and fiberglass  Post-procedure details:     Sensation:  Normal    Patient tolerance of procedure:   Tolerated well, no immediate complications

## 2021-03-26 ENCOUNTER — APPOINTMENT (OUTPATIENT)
Dept: RADIOLOGY | Facility: CLINIC | Age: 36
End: 2021-03-26
Payer: COMMERCIAL

## 2021-03-26 ENCOUNTER — OFFICE VISIT (OUTPATIENT)
Dept: OBGYN CLINIC | Facility: CLINIC | Age: 36
End: 2021-03-26

## 2021-03-26 VITALS
WEIGHT: 145 LBS | DIASTOLIC BLOOD PRESSURE: 63 MMHG | SYSTOLIC BLOOD PRESSURE: 97 MMHG | BODY MASS INDEX: 22.76 KG/M2 | HEIGHT: 67 IN | HEART RATE: 64 BPM

## 2021-03-26 DIAGNOSIS — Z47.89 AFTERCARE FOLLOWING SURGERY OF THE MUSCULOSKELETAL SYSTEM: ICD-10-CM

## 2021-03-26 DIAGNOSIS — Z47.89 AFTERCARE FOLLOWING SURGERY OF THE MUSCULOSKELETAL SYSTEM: Primary | ICD-10-CM

## 2021-03-26 PROCEDURE — 99024 POSTOP FOLLOW-UP VISIT: CPT | Performed by: ORTHOPAEDIC SURGERY

## 2021-03-26 PROCEDURE — 73140 X-RAY EXAM OF FINGER(S): CPT

## 2021-03-26 NOTE — PROGRESS NOTES
Assessment/Plan:  1  Aftercare following surgery of the musculoskeletal system  XR thumb left first digit-min 2v       Scribe Attestation    I,:  Ana Carrillo MA am acting as a scribe while in the presence of the attending physician :       I,:  Lieutenant Kevin DO personally performed the services described in this documentation    as scribed in my presence :             David is doing well  X-rays were reviewed  She will continue with the thumb spica cast for the next 2 weeks  She will remain nonweightbearing  She will follow up in 2 weeks for repeat evaluation, cast removal, repeat x-ray and pin removal      Subjective:   Dashawn Heck is a 30 y o  female who presents to the office today for follow up evaluation 2 weeks s/p CRPP left 1st metacarpal performed on 3/12/21  Patient states she is doing well  She denies any pain  She has been tolerating the cast well  She notes some numbness to the dorsal aspect of her hand  Review of Systems   Constitutional: Negative for chills and fever  HENT: Negative for drooling and sneezing  Eyes: Negative for redness  Respiratory: Negative for cough and wheezing  Gastrointestinal: Negative for nausea and vomiting  Musculoskeletal: Negative for arthralgias, joint swelling and myalgias  Neurological: Negative for weakness and numbness  Psychiatric/Behavioral: Negative for behavioral problems  The patient is not nervous/anxious  History reviewed  No pertinent past medical history      Past Surgical History:   Procedure Laterality Date    HAND FRACTURE REPAIR Left 3/12/2021    Procedure: OPEN REDUCTION W/ INTERNAL FIXATION (ORIF) HAND;  Surgeon: Lieutenant Kevin DO;  Location: [facility];  Service: Orthopedics    WISDOM TOOTH EXTRACTION         Family History   Problem Relation Age of Onset    Leukemia Maternal Grandmother     Diabetes Maternal Grandfather        Social History     Occupational History    Not on file   Tobacco Use    Smoking status: Never Smoker    Smokeless tobacco: Never Used   Substance and Sexual Activity    Alcohol use: Never     Frequency: Never    Drug use: Not on file    Sexual activity: Not on file         Current Outpatient Medications:     ibuprofen (MOTRIN) 200 mg tablet, Take by mouth every 6 (six) hours as needed for mild pain, Disp: , Rfl:     Allergies   Allergen Reactions    Amoxicillin Hives and Rash       Objective:  Vitals:    03/26/21 0813   BP: 97/63   Pulse: 64       Ortho Exam     Left hand    Thumb spica cast intact  No wounds   Compartments soft  Brisk capillary refill  S/m intact median, radial, and ulnar nerve     Physical Exam  Constitutional:       Appearance: She is well-developed  HENT:      Head: Normocephalic and atraumatic  Eyes:      General:         Right eye: No discharge  Left eye: No discharge  Conjunctiva/sclera: Conjunctivae normal    Neck:      Musculoskeletal: Normal range of motion and neck supple  Cardiovascular:      Rate and Rhythm: Normal rate  Pulmonary:      Effort: Pulmonary effort is normal  No respiratory distress  Musculoskeletal:      Comments: As noted in HPI   Skin:     General: Skin is warm and dry  Neurological:      Mental Status: She is alert and oriented to person, place, and time  Psychiatric:         Behavior: Behavior normal          Thought Content:  Thought content normal          Judgment: Judgment normal          I have personally reviewed pertinent films in PACS and my interpretation is as follows:  Healing thumb MC fx with appropriate alignment, no hardware failure or migration

## 2021-04-09 ENCOUNTER — APPOINTMENT (OUTPATIENT)
Dept: RADIOLOGY | Facility: CLINIC | Age: 36
End: 2021-04-09
Payer: COMMERCIAL

## 2021-04-09 ENCOUNTER — OFFICE VISIT (OUTPATIENT)
Dept: OBGYN CLINIC | Facility: CLINIC | Age: 36
End: 2021-04-09

## 2021-04-09 VITALS
DIASTOLIC BLOOD PRESSURE: 66 MMHG | HEIGHT: 67 IN | WEIGHT: 145 LBS | HEART RATE: 65 BPM | SYSTOLIC BLOOD PRESSURE: 106 MMHG | BODY MASS INDEX: 22.76 KG/M2

## 2021-04-09 DIAGNOSIS — Z47.89 AFTERCARE FOLLOWING SURGERY OF THE MUSCULOSKELETAL SYSTEM: Primary | ICD-10-CM

## 2021-04-09 DIAGNOSIS — Z47.89 AFTERCARE FOLLOWING SURGERY OF THE MUSCULOSKELETAL SYSTEM: ICD-10-CM

## 2021-04-09 PROCEDURE — 73140 X-RAY EXAM OF FINGER(S): CPT

## 2021-04-09 PROCEDURE — 99024 POSTOP FOLLOW-UP VISIT: CPT | Performed by: ORTHOPAEDIC SURGERY

## 2021-04-09 NOTE — PROGRESS NOTES
Assessment/Plan:  1  Aftercare following surgery of the musculoskeletal system  XR thumb left first digit-min 2v    Cast application       Scribe Attestation    I,:  Ana Carrillo MA am acting as a scribe while in the presence of the attending physician :       I,:  Reji Alvarez DO personally performed the services described in this documentation    as scribed in my presence  :             52-year-old female 4 weeks s/p CRPP left 1st metacarpal  Patient is doing well  The cast was removed in the office today  The pins were pulled without any complications  A thumb spica cast was applied in the office today  She will follow up in 2 weeks for repeat evaluation, cast removal, and repeat x-ray  Subjective:   Johnie Castro is a 29 y o  female who presents to the office today for follow up evaluation 4 weeks s/p CRPP left 1st metacarpal performed on 3/12/21  Patient states she is doing well  She has been tolerating the cast well  Review of Systems   Constitutional: Negative for chills and fever  HENT: Negative for drooling and sneezing  Eyes: Negative for redness  Respiratory: Negative for cough and wheezing  Gastrointestinal: Negative for nausea and vomiting  Musculoskeletal: Negative for arthralgias, joint swelling and myalgias  Neurological: Negative for weakness and numbness  Psychiatric/Behavioral: Negative for behavioral problems  The patient is not nervous/anxious  History reviewed  No pertinent past medical history      Past Surgical History:   Procedure Laterality Date    HAND FRACTURE REPAIR Left 3/12/2021    Procedure: OPEN REDUCTION W/ INTERNAL FIXATION (ORIF) HAND;  Surgeon: Reji Alvarez DO;  Location: [facility];  Service: Orthopedics    WISDOM TOOTH EXTRACTION         Family History   Problem Relation Age of Onset    Leukemia Maternal Grandmother     Diabetes Maternal Grandfather        Social History     Occupational History    Not on file   Tobacco Use    Smoking status: Never Smoker    Smokeless tobacco: Never Used   Substance and Sexual Activity    Alcohol use: Never     Frequency: Never    Drug use: Not on file    Sexual activity: Not on file         Current Outpatient Medications:     ibuprofen (MOTRIN) 200 mg tablet, Take by mouth every 6 (six) hours as needed for mild pain, Disp: , Rfl:     Allergies   Allergen Reactions    Amoxicillin Hives and Rash       Objective:  Vitals:    04/09/21 0839   BP: 106/66   Pulse: 65       Ortho Exam     Left thumb    No wounds  EPL FPL intact   Compartments soft  Brisk capillary refill  S/m intact median, radial, and ulnar nerve     Physical Exam  Constitutional:       Appearance: She is well-developed  HENT:      Head: Normocephalic and atraumatic  Eyes:      General:         Right eye: No discharge  Left eye: No discharge  Conjunctiva/sclera: Conjunctivae normal    Neck:      Musculoskeletal: Normal range of motion and neck supple  Cardiovascular:      Rate and Rhythm: Normal rate  Pulmonary:      Effort: Pulmonary effort is normal  No respiratory distress  Musculoskeletal:      Comments: As noted in HPI   Skin:     General: Skin is warm and dry  Neurological:      Mental Status: She is alert and oriented to person, place, and time  Psychiatric:         Behavior: Behavior normal          Thought Content: Thought content normal          Judgment: Judgment normal      Cast application    Date/Time: 4/9/2021 9:43 AM  Performed by: Will Motley DO  Authorized by: Will Motley DO   Universal Protocol:  Consent: Verbal consent obtained  Consent given by: patient  Patient identity confirmed: verbally with patient      Procedure details:     Laterality:  Left    Location:  Hand    Hand:  L handCast type: thumb spica     Supplies:  Cotton padding and fiberglass  Post-procedure details:     Patient tolerance of procedure:   Tolerated well, no immediate complications          I have personally reviewed pertinent films in PACS and my interpretation is as follows:X-ray left thumb performed in the office today demonstrates good fracture healing and stable orthopedic hardware

## 2021-04-23 ENCOUNTER — APPOINTMENT (OUTPATIENT)
Dept: RADIOLOGY | Facility: CLINIC | Age: 36
End: 2021-04-23
Payer: COMMERCIAL

## 2021-04-23 ENCOUNTER — OFFICE VISIT (OUTPATIENT)
Dept: OBGYN CLINIC | Facility: CLINIC | Age: 36
End: 2021-04-23

## 2021-04-23 VITALS
HEART RATE: 92 BPM | BODY MASS INDEX: 22.76 KG/M2 | HEIGHT: 67 IN | SYSTOLIC BLOOD PRESSURE: 120 MMHG | WEIGHT: 145 LBS | DIASTOLIC BLOOD PRESSURE: 80 MMHG

## 2021-04-23 DIAGNOSIS — Z47.89 AFTERCARE FOLLOWING SURGERY OF THE MUSCULOSKELETAL SYSTEM: ICD-10-CM

## 2021-04-23 DIAGNOSIS — Z47.89 AFTERCARE FOLLOWING SURGERY OF THE MUSCULOSKELETAL SYSTEM: Primary | ICD-10-CM

## 2021-04-23 PROCEDURE — 73140 X-RAY EXAM OF FINGER(S): CPT

## 2021-04-23 PROCEDURE — 99024 POSTOP FOLLOW-UP VISIT: CPT | Performed by: ORTHOPAEDIC SURGERY

## 2021-04-23 NOTE — PROGRESS NOTES
Assessment/Plan:  1  Aftercare following surgery of the musculoskeletal system  XR thumb right first digit-min 2v    Ambulatory referral to PT/OT hand therapy    Durable Medical Equipment       Scribe Attestation    I,:  Celsa Clemons MA am acting as a scribe while in the presence of the attending physician :       I,:  Ya Thomas DO personally performed the services described in this documentation    as scribed in my presence  :             27-year-old female 6 weeks status post CRPP left 1st metacarpal  Patient is doing well postoperatively  X-rays were reviewed which demonstrates good healing  I explained to her that the numbness she is experiencing may be related to this cast and this should continue to resolve  She may take Vitamin B 6 and B 12 to help with this  A referral was provided to therapy for motion and strengthening  She was fitted and provided with a thumb spica brace  She will wear this while around the horses  She will follow up in 2 weeks for repeat evaluation and repeat x-ray  Subjective:   Briana Rodriguez is a 29 y o  female who presents to the office today for follow up evaluation 6 weeks s/p CRPP left 1st metacarpal performed on 3/12/21  Patient states she is doing well  She has been tolerating the cast well  She denies any pain  She notes stiffness since the cast was removed  She notes some numbness to her thumb  Review of Systems   Constitutional: Negative for chills and fever  HENT: Negative for drooling and sneezing  Eyes: Negative for redness  Respiratory: Negative for cough and wheezing  Gastrointestinal: Negative for nausea and vomiting  Musculoskeletal: Negative for arthralgias, joint swelling and myalgias  Neurological: Negative for weakness and numbness  Psychiatric/Behavioral: Negative for behavioral problems  The patient is not nervous/anxious  History reviewed  No pertinent past medical history      Past Surgical History:   Procedure Laterality Date    HAND FRACTURE REPAIR Left 3/12/2021    Procedure: OPEN REDUCTION W/ INTERNAL FIXATION (ORIF) HAND;  Surgeon: Demetrice Castro DO;  Location: [address] [place] [address];  Service: Orthopedics    WISDOM TOOTH EXTRACTION         Family History   Problem Relation Age of Onset    Leukemia Maternal Grandmother     Diabetes Maternal Grandfather        Social History     Occupational History    Not on file   Tobacco Use    Smoking status: Never Smoker    Smokeless tobacco: Never Used   Substance and Sexual Activity    Alcohol use: Never     Frequency: Never    Drug use: Not on file    Sexual activity: Not on file         Current Outpatient Medications:     ibuprofen (MOTRIN) 200 mg tablet, Take by mouth every 6 (six) hours as needed for mild pain, Disp: , Rfl:     Allergies   Allergen Reactions    Amoxicillin Hives and Rash       Objective:  Vitals:    04/23/21 0825   BP: 120/80   Pulse: 92       Ortho Exam     Left thumb      No wounds secondary to cast  Compartments soft  Brisk capillary refill  S/m intact median, radial, and ulnar nerve     Physical Exam  Constitutional:       Appearance: She is well-developed  HENT:      Head: Normocephalic and atraumatic  Eyes:      General:         Right eye: No discharge  Left eye: No discharge  Conjunctiva/sclera: Conjunctivae normal    Neck:      Musculoskeletal: Normal range of motion and neck supple  Cardiovascular:      Rate and Rhythm: Normal rate  Pulmonary:      Effort: Pulmonary effort is normal  No respiratory distress  Musculoskeletal:      Comments: As noted in HPI   Skin:     General: Skin is warm and dry  Neurological:      Mental Status: She is alert and oriented to person, place, and time  Psychiatric:         Behavior: Behavior normal          Thought Content:  Thought content normal          Judgment: Judgment normal          I have personally reviewed pertinent films in PACS and my interpretation is as follows:X-ray left thumb performed in the office today demonstrates good fracture healing

## 2021-06-04 ENCOUNTER — OFFICE VISIT (OUTPATIENT)
Dept: OBGYN CLINIC | Facility: CLINIC | Age: 36
End: 2021-06-04

## 2021-06-04 ENCOUNTER — APPOINTMENT (OUTPATIENT)
Dept: RADIOLOGY | Facility: CLINIC | Age: 36
End: 2021-06-04
Payer: COMMERCIAL

## 2021-06-04 VITALS
SYSTOLIC BLOOD PRESSURE: 101 MMHG | HEIGHT: 67 IN | DIASTOLIC BLOOD PRESSURE: 60 MMHG | HEART RATE: 73 BPM | WEIGHT: 145 LBS | BODY MASS INDEX: 22.76 KG/M2

## 2021-06-04 DIAGNOSIS — M79.644 PAIN OF RIGHT THUMB: ICD-10-CM

## 2021-06-04 DIAGNOSIS — Z47.89 AFTERCARE FOLLOWING SURGERY OF THE MUSCULOSKELETAL SYSTEM: Primary | ICD-10-CM

## 2021-06-04 PROCEDURE — 99024 POSTOP FOLLOW-UP VISIT: CPT | Performed by: ORTHOPAEDIC SURGERY

## 2021-06-04 PROCEDURE — 73140 X-RAY EXAM OF FINGER(S): CPT

## 2021-06-04 NOTE — PROGRESS NOTES
Assessment/Plan:  1  Aftercare following surgery of the musculoskeletal system         Scribe Attestation    I,:  Ana Carrillo MA am acting as a scribe while in the presence of the attending physician :       I,:  Tung Guadarrama DO personally performed the services described in this documentation    as scribed in my presence  :             71-year-old female 12 weeks status post CRPP left first metacarpal  Patient is doing well postoperatively  We did discuss formal therapy to work on motion and strengthening  Patient deferred this today  She will continue with physician directed HEP  She has no restrictions at this time  She will follow up in 6 weeks for repeat evaluation  If she is doing well, she may cancel  Subjective:   Diamantina Aschoff is a 29 y o  female who presents to the office today for follow up evaluation 12 weeks s/p CRPP left 1 st metacarpal performed on 3/12/21  Patient states she is doing well  She has been compliant with HEP  She notes a pulling sensation with certain activities  She also notes some weakness about the hand  Review of Systems   Constitutional: Negative for chills and fever  HENT: Negative for drooling and sneezing  Eyes: Negative for redness  Respiratory: Negative for cough and wheezing  Gastrointestinal: Negative for nausea and vomiting  Musculoskeletal: Negative for arthralgias, joint swelling and myalgias  Neurological: Negative for weakness and numbness  Psychiatric/Behavioral: Negative for behavioral problems  The patient is not nervous/anxious  No past medical history on file      Past Surgical History:   Procedure Laterality Date    HAND FRACTURE REPAIR Left 3/12/2021    Procedure: OPEN REDUCTION W/ INTERNAL FIXATION (ORIF) HAND;  Surgeon: Tung Guadarrama DO;  Location: [facility];  Service: Orthopedics    WISDOM TOOTH EXTRACTION         Family History   Problem Relation Age of Onset    Leukemia Maternal Grandmother     Diabetes Maternal Grandfather        Social History     Occupational History    Not on file   Tobacco Use    Smoking status: Never Smoker    Smokeless tobacco: Never Used   Substance and Sexual Activity    Alcohol use: Never     Frequency: Never    Drug use: Not on file    Sexual activity: Not on file         Current Outpatient Medications:     ibuprofen (MOTRIN) 200 mg tablet, Take by mouth every 6 (six) hours as needed for mild pain, Disp: , Rfl:     Allergies   Allergen Reactions    Amoxicillin Hives and Rash       Objective: There were no vitals filed for this visit  Ortho Exam     Left thumb    Can get hand flat  Can oppose small finger   Compartments soft  Brisk capillary refill  S/m intact median, radial, and ulnar nerve     Physical Exam  Constitutional:       Appearance: She is well-developed  HENT:      Head: Normocephalic and atraumatic  Eyes:      General:         Right eye: No discharge  Left eye: No discharge  Conjunctiva/sclera: Conjunctivae normal    Neck:      Musculoskeletal: Normal range of motion and neck supple  Cardiovascular:      Rate and Rhythm: Normal rate  Pulmonary:      Effort: Pulmonary effort is normal  No respiratory distress  Musculoskeletal:      Comments: As noted in HPI   Skin:     General: Skin is warm and dry  Neurological:      Mental Status: She is alert and oriented to person, place, and time  Psychiatric:         Behavior: Behavior normal          Thought Content: Thought content normal          Judgment: Judgment normal          I have personally reviewed pertinent films in PACS and my interpretation is as follows:X-ray left thumb performed in the office today demonstrates good fracture healing

## (undated) DEVICE — BRUSH EZ SCRUB PCMX W/NAIL CLEANER

## (undated) DEVICE — SPONGE GAUZE 4 X 9

## (undated) DEVICE — BASIC DOUBLE BASIN 2-LF: Brand: MEDLINE INDUSTRIES, INC.

## (undated) DEVICE — CURITY NON-ADHERENT STRIPS: Brand: CURITY

## (undated) DEVICE — FABRIC REINFORCED, SURGICAL GOWN, XL: Brand: CONVERTORS

## (undated) DEVICE — PACK GENERAL LF

## (undated) DEVICE — GLOVE SRG BIOGEL 7.5

## (undated) DEVICE — PADDING CAST 3IN COTTON STRL

## (undated) DEVICE — DRAPE SHEET THREE QUARTER

## (undated) DEVICE — PADDING CAST 4 IN  COTTON STRL

## (undated) DEVICE — CUFF TOURNIQUET 18 X 4 IN QUICK CONNECT DISP 1 BLADDER

## (undated) DEVICE — ACE WRAP 4 IN UNSTERILE

## (undated) DEVICE — SCD SEQUENTIAL COMPRESSION COMFORT SLEEVE MEDIUM KNEE LENGTH: Brand: KENDALL SCD

## (undated) DEVICE — TIBURON HAND DRAPE: Brand: CONVERTORS

## (undated) DEVICE — CAP PROTECTIVE 0.45IN WHITE

## (undated) DEVICE — DISPOSABLE EQUIPMENT COVER: Brand: SMALL TOWEL DRAPE

## (undated) DEVICE — BANDAGE, ESMARK LF STR 4"X9'(20/CS): Brand: CYPRESS

## (undated) DEVICE — STOCKINETTE REGULAR

## (undated) DEVICE — GLOVE INDICATOR PI UNDERGLOVE SZ 7.5 BLUE

## (undated) DEVICE — COBAN 4 IN STERILE

## (undated) DEVICE — DRAPE C-ARM X-RAY